# Patient Record
Sex: MALE | Race: BLACK OR AFRICAN AMERICAN | NOT HISPANIC OR LATINO
[De-identification: names, ages, dates, MRNs, and addresses within clinical notes are randomized per-mention and may not be internally consistent; named-entity substitution may affect disease eponyms.]

---
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
## 2018-03-16 ENCOUNTER — APPOINTMENT (OUTPATIENT)
Dept: ORTHOPEDIC SURGERY | Facility: CLINIC | Age: 18
End: 2018-03-16
Payer: MEDICAID

## 2018-03-16 PROCEDURE — 99213 OFFICE O/P EST LOW 20 MIN: CPT

## 2018-05-02 ENCOUNTER — OUTPATIENT (OUTPATIENT)
Dept: OUTPATIENT SERVICES | Facility: HOSPITAL | Age: 18
LOS: 1 days | End: 2018-05-02

## 2018-05-02 VITALS
HEART RATE: 56 BPM | HEIGHT: 75 IN | SYSTOLIC BLOOD PRESSURE: 110 MMHG | RESPIRATION RATE: 16 BRPM | DIASTOLIC BLOOD PRESSURE: 70 MMHG | TEMPERATURE: 97 F | WEIGHT: 175.05 LBS

## 2018-05-02 DIAGNOSIS — S43.439A SUPERIOR GLENOID LABRUM LESION OF UNSPECIFIED SHOULDER, INITIAL ENCOUNTER: ICD-10-CM

## 2018-05-02 DIAGNOSIS — S43.431D SUPERIOR GLENOID LABRUM LESION OF RIGHT SHOULDER, SUBSEQUENT ENCOUNTER: ICD-10-CM

## 2018-05-02 LAB
HCT VFR BLD CALC: 45.8 % — SIGNIFICANT CHANGE UP (ref 39–50)
HGB BLD-MCNC: 15.7 G/DL — SIGNIFICANT CHANGE UP (ref 13–17)
MCHC RBC-ENTMCNC: 31.8 PG — SIGNIFICANT CHANGE UP (ref 27–34)
MCHC RBC-ENTMCNC: 34.3 % — SIGNIFICANT CHANGE UP (ref 32–36)
MCV RBC AUTO: 92.7 FL — SIGNIFICANT CHANGE UP (ref 80–100)
NRBC # FLD: 0 — SIGNIFICANT CHANGE UP
PLATELET # BLD AUTO: 275 K/UL — SIGNIFICANT CHANGE UP (ref 150–400)
PMV BLD: 9.4 FL — SIGNIFICANT CHANGE UP (ref 7–13)
RBC # BLD: 4.94 M/UL — SIGNIFICANT CHANGE UP (ref 4.2–5.8)
RBC # FLD: 12.2 % — SIGNIFICANT CHANGE UP (ref 10.3–14.5)
WBC # BLD: 9.72 K/UL — SIGNIFICANT CHANGE UP (ref 3.8–10.5)
WBC # FLD AUTO: 9.72 K/UL — SIGNIFICANT CHANGE UP (ref 3.8–10.5)

## 2018-05-02 NOTE — H&P PST ADULT - PROBLEM SELECTOR PLAN 1
Pt. is scheduled for right shoulder arthroscopic posterior labral repair on 5/10/18.  Preoperative instructions reviewed, pt verbalized understanding.  Preop Famotidine provided.  Lab results pending

## 2018-05-02 NOTE — H&P PST ADULT - HISTORY OF PRESENT ILLNESS
16 y/o male with history of right shoulder pain presents to PAST for presurgical evaluation.  He injured his shoulder approximately 2 years ago playing basketball.  Initially after rest the pain improved, but he reinjured it approximately a year ago working out.  He complains of pain with certain movements or when he uses the right arm too much.  The pain is worse after trying to play basketball. His ROM is limited due to the pain.  He is scheduled for right shoulder arthroscopic posterior labral repair on 5/10/18.

## 2018-05-09 ENCOUNTER — TRANSCRIPTION ENCOUNTER (OUTPATIENT)
Age: 18
End: 2018-05-09

## 2018-05-10 ENCOUNTER — OUTPATIENT (OUTPATIENT)
Dept: OUTPATIENT SERVICES | Facility: HOSPITAL | Age: 18
LOS: 1 days | Discharge: HOME | End: 2018-05-10
Payer: MEDICAID

## 2018-05-10 ENCOUNTER — APPOINTMENT (OUTPATIENT)
Dept: ORTHOPEDIC SURGERY | Facility: AMBULATORY SURGERY CENTER | Age: 18
End: 2018-05-10

## 2018-05-10 VITALS — HEART RATE: 64 BPM | OXYGEN SATURATION: 100 % | DIASTOLIC BLOOD PRESSURE: 56 MMHG | SYSTOLIC BLOOD PRESSURE: 104 MMHG

## 2018-05-10 VITALS
TEMPERATURE: 98 F | HEART RATE: 54 BPM | WEIGHT: 175.05 LBS | OXYGEN SATURATION: 99 % | SYSTOLIC BLOOD PRESSURE: 118 MMHG | RESPIRATION RATE: 16 BRPM | DIASTOLIC BLOOD PRESSURE: 68 MMHG | HEIGHT: 75 IN

## 2018-05-10 DIAGNOSIS — S43.431D SUPERIOR GLENOID LABRUM LESION OF RIGHT SHOULDER, SUBSEQUENT ENCOUNTER: ICD-10-CM

## 2018-05-10 PROCEDURE — 29806 SHO ARTHRS SRG CAPSULORRAPHY: CPT | Mod: RT

## 2018-05-10 NOTE — ASU DISCHARGE PLAN (ADULT/PEDIATRIC). - SPECIAL INSTRUCTIONS
see instruction sheet see instruction sheet  POST OPERATIVE INSTRUCTIONS ARTHROSCOPIC SHOULDER STABILIZATION from DR. HOPKINS

## 2018-05-10 NOTE — ASU DISCHARGE PLAN (ADULT/PEDIATRIC). - MEDICATION SUMMARY - MEDICATIONS TO TAKE
I will START or STAY ON the medications listed below when I get home from the hospital:    Percocet 5/325 oral tablet  -- 1-2 tab(s) by mouth every 4 hours, As Needed MDD:10 tabs    -- Caution federal law prohibits the transfer of this drug to any person other  than the person for whom it was prescribed.  May cause drowsiness.  Alcohol may intensify this effect.  Use care when operating dangerous machinery.  This prescription cannot be refilled.  This product contains acetaminophen.  Do not use  with any other product containing acetaminophen to prevent possible liver damage.  Using more of this medication than prescribed may cause serious breathing problems.    -- Indication: For pain

## 2018-05-10 NOTE — ASU DISCHARGE PLAN (ADULT/PEDIATRIC). - NURSING INSTRUCTIONS
No fried, spicy or greasy foods. Increase fluids as tolerated  If your doctor prescribed narcotic pain medications after your procedure to help prevent and reduce constipation, increase fluid intake and fiber intake in your diet. You may take OTC Stool Softeners such as Colace to help reduce constipation.  Apply ice for 15 to 20 minutes every hour for the first day. Use an ice pack or put crushed ice in a plastic bag. Cover it with a cloth. Ice helps prevent tissue damage and decreases swelling and pain.

## 2018-05-10 NOTE — BRIEF OPERATIVE NOTE - PROCEDURE
<<-----Click on this checkbox to enter Procedure Labral repair of right shoulder  05/10/2018    Active  PABLOL

## 2018-05-18 ENCOUNTER — APPOINTMENT (OUTPATIENT)
Dept: ORTHOPEDIC SURGERY | Facility: CLINIC | Age: 18
End: 2018-05-18
Payer: MEDICAID

## 2018-05-18 PROCEDURE — 99024 POSTOP FOLLOW-UP VISIT: CPT

## 2018-07-26 ENCOUNTER — APPOINTMENT (OUTPATIENT)
Dept: ORTHOPEDIC SURGERY | Facility: CLINIC | Age: 18
End: 2018-07-26
Payer: MEDICAID

## 2018-07-26 DIAGNOSIS — S43.431D SUPERIOR GLENOID LABRUM LESION OF RIGHT SHOULDER, SUBSEQUENT ENCOUNTER: ICD-10-CM

## 2018-07-26 PROCEDURE — 99024 POSTOP FOLLOW-UP VISIT: CPT

## 2018-10-16 ENCOUNTER — OTHER (OUTPATIENT)
Age: 18
End: 2018-10-16

## 2019-01-08 NOTE — PDOC
History of Present Illness





- General


Chief Complaint: Sore Throat


Stated Complaint: SWOLLEN THROAT


Time Seen by Provider: 01/08/19 13:44


History Source: Patient


Exam Limitations: No Limitations





- History of Present Illness


Initial Comments: 





19 yo M w no sig pmh presents with one day of "throat pain and uvula swelling." 

He woke up this morning and said the back of his throat felt uncomfortable. He 

rates the pain as 4/10. He does not admit to any dysphagia. He denies recent 

fevers, chills, infections, cough, or neck pain. 





Denies chest pain, SOB, difficulty breathing, headache, neck pain, blurry vision

, back pain, odynophagia. 





PCP: Dr. Preciado


PSH: Right shoulder rotater cuff surgery


Allergies: NKA, NKDA


Social Hx: Denies smoking, drinking, or other substance usage. 














Past History





- Past Medical History


Allergies/Adverse Reactions: 


 Allergies











Allergy/AdvReac Type Severity Reaction Status Date / Time


 


No Known Allergies Allergy   Verified 01/08/19 13:41











Home Medications: 


Ambulatory Orders





NK [No Known Home Medication]  01/08/19 








COPD: No


Other medical history: DENIES





- Suicide/Smoking/Psychosocial Hx


Smoking History: Never smoked


Hx Alcohol Use: No


Drug/Substance Use Hx: No





**Review of Systems





- Review of Systems


Able to Perform ROS?: Yes


Comments:: 





CONSTITUTIONAL:


Absent: fever, no chills, no fatigue


EYES:


Absent: visual changes


ENT:


Absent: ear pain, no sore throat


CARDIOVASCULAR:


Absent: chest pain, no palpitations


RESPIRATORY:


Absent: cough, no SOB


GI:


Absent: abdominal pain, no nausea, no vomiting, no constipation, no diarrhea


GENITOURINARY:


Absent: dysuria, no frequency, no hematuria


MUSKULOSKELETAL:


Absent: back pain, no arthralgia, no myalgia


SKIN:


Absent: rash


NEURO:


Absent: headache








*Physical Exam





- Vital Signs


 Last Vital Signs











Temp Pulse Resp BP Pulse Ox


 


 98.6 F   59 L  18   124/81   100 


 


 01/08/19 13:41  01/08/19 13:41  01/08/19 13:41  01/08/19 13:41  01/08/19 13:41




















- Physical Exam


Comments: 





GENERAL: 


Well-appearing, well-nourished. No apparent distress.


HEEN: 


Normocephalic, atraumatic. PERRL, EOM intact.


THROAT: 


There is no abdnomarl peritonsillar discharge. The R tonsil is mildly 

erythematous and swollen. No adenopathy. 


CARDIOVASCULAR: 


Normal S1, S2. Regular rate and rhythm.


PULMONARY: 


Clear to auscultation bilaterally.


ABDOMEN: 


Soft, non-distended, non-tender. 


EXTREMITIES: 


Normal ROM in all four extremities. No gross deformities.


SKIN: 


Warm, dry.  No rash


NEUROLOGICAL: 


No focal neurological deficits.











Moderate Sedation





- Procedure Monitoring


Vital Signs: 


Procedure Monitoring Vital Signs











Temperature  98.6 F   01/08/19 13:41


 


Pulse Rate  59 L  01/08/19 13:41


 


Respiratory Rate  18   01/08/19 13:41


 


Blood Pressure  124/81   01/08/19 13:41


 


O2 Sat by Pulse Oximetry (%)  100   01/08/19 13:41











Medical Decision Making





- Medical Decision Making





19 yo M w no sig pmh presents with one day of "throat pain and uvula swelling." 

He woke up this morning and said the back of his throat felt uncomfortable.





DDx IBNLT: Strep throat, sophia-tonsilar abscess, URI. 





Plan: Rapid strep test, toradol, re-assess. 





Strep Negative. 


This is likely a URI. 





DC with supportive care and PCP fu. 








*DC/Admit/Observation/Transfer


Diagnosis at time of Disposition: 


 Tonsil pain, URI (upper respiratory infection)








- Discharge Dispostion


Disposition: HOME


Condition at time of disposition: Stable


Decision to Admit order: No





- Referrals


Referrals: 


[facility] [place] [facility] [place] [Provider Group]





- Patient Instructions


Printed Discharge Instructions:  Sore Throat, DI for Pharyngitis/

Tonsillopharyngitis -- Adult


Additional Instructions: 


You came into the ER with throat pain. We did a test which showed you do not 

have strep throat. We believe you have an upper respiratory infection. Take 

motrin, advil, ibuprofen or tylenol as needed for pain control. Make sure to 

call your primary care doctor in the next 24 hours to schedule a follow up 

appointment. 





Come back to the ER if your pain worsens, you get a fever, start vomiting, or 

have any other new or worsening concerns. 





Thank you for coming to the [facility] ED. We hope you feel better soon! 


Print Language: ENGLISH





- Post Discharge Activity

## 2019-01-08 NOTE — PDOC
Attending Attestation





- Resident


Resident Name: Eleno Bustamante





- ED Attending Attestation


I have performed the following: I have examined & evaluated the patient, The 

case was reviewed & discussed with the resident, I agree w/resident's findings 

& plan, Exceptions are as noted





- HPI


HPI: 





Reviewed residents HPI





- Physicial Exam


PE: 





01/08/19 14:55








Vitals: Triage Vital signs reviewed  


General Appearance:  no acute distress, well nourished well developed, 


Head: Atraumatic, 


Ears:  TM's normal bilaterally;


Nose: Nares patent bilaterally;no nasal congestion


Throat: Posterior oropharynx with mild erythema, and mild swelling to the right 

tonsil, mucous membranes moist,


Neck:  Supple;No Nucal rigidity


Chest Wall: Nontender


Cardiac: Regular rate and rhythym, no murmurs, no rubs, no gallops, 


Lungs: Clear to auscultation bilateral, good air movement bilaterally,


Skin:  Warm and dry, no rashes or lesions, no rash, no petechiae


Psych:  normal mood, normal affect








- Medical Decision Making





01/08/19 14:56





Mild right tonsillar enlargement no evidence of peritonsillar abscess rapid flu 

test negative no airway issues no odynophagia no severe pain





We'll treat with conservative measures likely viral pharyngitis patient 

instructed to return to ED for any severe worsening symptoms burning concerns.

## 2019-09-11 ENCOUNTER — HOSPITAL ENCOUNTER (EMERGENCY)
Dept: HOSPITAL 74 - FER | Age: 19
Discharge: HOME | End: 2019-09-11
Payer: COMMERCIAL

## 2019-09-11 VITALS — BODY MASS INDEX: 23.7 KG/M2

## 2019-09-11 VITALS — HEART RATE: 69 BPM | DIASTOLIC BLOOD PRESSURE: 79 MMHG | SYSTOLIC BLOOD PRESSURE: 125 MMHG | TEMPERATURE: 98.6 F
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
## 2019-09-11 DIAGNOSIS — S83.8X1A: Primary | ICD-10-CM

## 2019-09-11 PROCEDURE — 2W3LX1Z IMMOBILIZATION OF RIGHT LOWER EXTREMITY USING SPLINT: ICD-10-PCS

## 2019-09-11 NOTE — PDOC
History of Present Illness





- General


Chief Complaint: Injury


Stated Complaint: RIGHT KNEE INJURY


Time Seen by Provider: 09/11/19 12:50





- History of Present Illness


Initial Comments: 





09/11/19 13:57


19-year-old male with no significant past medical history presents with knee 

pain for 1 hour. Patient reports he was running while playing basketball when a 

player suddenly stopped him while his R leg was planted but the rest of his 

body twisted. Pt reports hearing a "pop" which prompted him to sit down on the 

ground. Denies any head strike or LOC. Denies any other injuries. Reports 5 out 

of 10 pain at this time and his knee while resting but reports pain improved 

significantly when he tries to stand. Has not taken any medications. EMS was 

called and patient was transported to the emergency department. He has been in 

his usual state of health, denies recent fevers, chills, chest pain, shortness 

of breath, dizziness, weakness or numbness. Denies any urinary symptoms.





Past History





- Past Medical History


Allergies/Adverse Reactions: 


 Allergies











Allergy/AdvReac Type Severity Reaction Status Date / Time


 


No Known Allergies Allergy   Verified 01/08/19 13:41











Home Medications: 


Ambulatory Orders





NK [No Known Home Medication]  09/11/19 








COPD: No





- Suicide/Smoking/Psychosocial Hx


Smoking History: Never smoked


Information on smoking cessation initiated: No


Hx Alcohol Use: Yes (OCCASSIONAL)


Drug/Substance Use Hx: Yes (WEED)





**Review of Systems





- Review of Systems


Comments:: 





09/11/19 14:03


GENERAL/CONSTITUTIONAL: No fever or chills. No weakness.


HEAD, EYES, EARS, NOSE AND THROAT: No change in vision. No ear pain or 

discharge. No sore throat.


GASTROINTESTINAL: No nausea, vomiting, diarrhea or constipation.


GENITOURINARY: No dysuria, frequency, or change in urination.


CARDIOVASCULAR: No chest pain or shortness of breath.


RESPIRATORY: No cough, wheezing, or hemoptysis.


MUSCULOSKELETAL: +Knee pain. No muscle swelling or pain. No neck or back pain.


SKIN: No rash


NEUROLOGIC: No headache, vertigo, loss of consciousness, or change in strength/

sensation.


ENDOCRINE: No increased thirst. No abnormal weight change.


HEMATOLOGIC/LYMPHATIC: No anemia, easy bleeding, or history of blood clots.


ALLERGIC/IMMUNOLOGIC: No hives or skin allergy.





*Physical Exam





- Vital Signs


 Last Vital Signs











Temp Pulse Resp BP Pulse Ox


 


 98.6 F   69   16   125/79   100 


 


 09/11/19 12:48  09/11/19 12:48  09/11/19 12:48  09/11/19 12:48  09/11/19 12:48














- Physical Exam


Comments: 





09/11/19 14:04


GENERAL: Awake, alert, and fully oriented, in no acute distress


HEAD: No signs of trauma


EYES: PERRLA, EOMI, sclera anicteric, conjunctiva clear


ENT: ANares patent, oropharynx clear without exudates. Moist mucosa


NECK: Normal ROM, supple, no lymphadenopathy, JVD, or masses


LUNGS: Breath sounds equal, clear to auscultation bilaterally.  No wheezes, and 

no crackles


HEART: Regular rate and rhythm, normal S1 and S2, no murmurs, rubs or gallops


ABDOMEN: Soft, nontender, normoactive bowel sounds.  No guarding, no rebound.  

No masses


EXTREMITIES: R knee with FROM, no edema, no focal bony ttp. +mild laxity and 

pain with valgus stress to the knee. Negative lachman, anterior drawer and 

posterior drawer test, negative mcmurrays test. 2+ DP and TP pulses. WWP 

distally. Compartments of RLE are soft. 


NEUROLOGICAL:  Normal speech, cranial nerves intact, equal strength and 

sensation b/l


SKIN: Warm, Dry, normal turgor, no rashes or lesions noted.





ED Treatment Course





- RADIOLOGY


Radiology Studies Ordered: 














 Category Date Time Status


 


 KNEE 4 POS-RIGHT [RAD] Stat Radiology  09/11/19 12:49 Taken














Medical Decision Making





- Medical Decision Making





09/11/19 14:21


20yo M presents to the ED with R knee pain after playing basketball


RLE neurovasc intact


XR negative for bony injury 


Exam most consistent with MCL sprain/tear


Plan for knee immobilizer, crutches, naproxen for pain and f/u with orthopedics


Pain well controlled


Pt clincially stable for Dc home





I discussed the physical exam findings, ancillary test results and final 

diagnoses with the patient. I answered all of the patient's questions. The 

patient was satisfied with the care received and felt comfortable with the 

discharge plan and treatment plan. The patient will call their primary care 

physician within 24 hours to arrange follow-up and will return to the Emergency 

Department with any new, persistent or worsening symptoms. 





*DC/Admit/Observation/Transfer


Diagnosis at time of Disposition: 


 Knee pain, acute, MCL sprain of right knee, Injury while playing basketball








- Discharge Dispostion


Disposition: HOME


Condition at time of disposition: Stable


Decision to Admit order: No





- Referrals


Referrals: 


Abhi Calvillo MD [Staff Physician] - 





- Patient Instructions


Printed Discharge Instructions:  DI for Knee Pain


Additional Instructions: 


You likely have a ligamentous injury to your knee. You must follow up with the 

orthopedic doctor within 1 week for further evaluation. 





You may take naproxen for pain as needed twice a day. Do not take motrin, aleve

, ibuprofen, advil or any other NSAID medication if you are taking naproxen as 

it can damage your kidneys or cause gastrointestinal distress. 





You may also take tylenol (per label instructions) if naproxen is not 

controlling your pain.





Apply ice to your right knee 3-4 times daily for 20 minutes at a time





Return to the emergency department if you have any new, worsening, or 

concerning symptoms.





- Post Discharge Activity





- Attestations


Physician Attestion: 





09/11/19 14:28








I, Dr. Yessy Farley MD, attest that this document has been prepared under my 

direction and personally reviewed by me in its entirety.   I further attest, 

that it accurately reflects all work, treatment, procedures and medical decision

-making performed by me.

## 2019-10-31 ENCOUNTER — HOSPITAL ENCOUNTER (EMERGENCY)
Dept: HOSPITAL 74 - FER | Age: 19
Discharge: HOME | End: 2019-10-31
Payer: COMMERCIAL

## 2019-10-31 VITALS — TEMPERATURE: 99 F | SYSTOLIC BLOOD PRESSURE: 125 MMHG | DIASTOLIC BLOOD PRESSURE: 79 MMHG | HEART RATE: 81 BPM

## 2019-10-31 VITALS — BODY MASS INDEX: 24.3 KG/M2

## 2019-10-31 DIAGNOSIS — F17.210: ICD-10-CM

## 2019-10-31 DIAGNOSIS — R51: Primary | ICD-10-CM

## 2019-10-31 DIAGNOSIS — R09.81: ICD-10-CM

## 2019-10-31 PROCEDURE — 3E0337Z INTRODUCTION OF ELECTROLYTIC AND WATER BALANCE SUBSTANCE INTO PERIPHERAL VEIN, PERCUTANEOUS APPROACH: ICD-10-PCS

## 2019-10-31 NOTE — PDOC
Attending Attestation





- Resident


Resident Name: Sa Aminataira





- ED Attending Attestation


I have performed the following: I have examined & evaluated the patient, The 

case was reviewed & discussed with the resident, I agree w/resident's findings 

& plan





- HPI


HPI: 





10/31/19 17:22





19 YOM with no sig med history presenting with sinus congestion, throat pain, 

headache x 4 days. initially with sore throat, which has improved. no 

odynophagia, no fever or chills, no cough, no cp or sob. 


no ear pain


has not take much for his pain or symptoms.








- Physicial Exam


PE: 





10/31/19 17:23











alert, no acute distress.NCAT, PERRL, EOMI, clear conjunctiva, anicteric, moist 

mucus membranes, oropharynx clear. Airway patent, normal phonation. Uvula 

midline. no tonsillar hypertrophy. No sinus tenderness, TM clear, no pinna 

tenderness to manipulation. 


No rash, breathing comfortably on room air, speaking full sentences.  Abdomen 

is soft and nontender, no neck tenderness or rigidity, ESTRADA x4.no focal neuro 

deficits











- Medical Decision Making





10/31/19 17:25


Vital Signs











Temp Pulse Resp BP Pulse Ox


 


 99 F   81   19   125/79   97 


 


 10/31/19 17:03  10/31/19 17:03  10/31/19 17:03  10/31/19 17:03  10/31/19 17:03








Vital signs reviewed within normal limits.  Nontoxic-appearing, tolerating oral 

intake.  Neuro intact.  No systemic findings.  HEENT is unremarkable, no 

tonsillar findings or oropharyngeal abnormalities to suggest strep infection.  

no airway obstruction. Syndrome the patient is exhibiting is most likely due to 

a viral pharyngitis versus upper respiratory infection.  Will trial saline nebs 

and Afrin sprays in the affected naris for appropriate decongestion.  Not to be 

used more than 3 days.  Continue with supportive care, adequate hydration, OTC 

analgesia and follow-up with primary care doctor, return precautions advised.





10/31/19 17:50

## 2019-10-31 NOTE — PDOC
History of Present Illness





- General


Chief Complaint: Headache


Stated Complaint: nasal congestion and headache


Time Seen by Provider: 10/31/19 17:04


History Source: Patient


Exam Limitations: No Limitations





- History of Present Illness


Initial Comments: 





10/31/19 17:36


19y M with no significant PMH presenting to ED with complaints of congestion 

and headache. Symptoms began 3d ago with sore throat. The sore throat is 

resolved but the headache and congestion persist. He says the headache is 

frontal along the sinuses. Has not had headaches before. Denies changes in 

vision, fevers, chills, earache, neck pain, abdominal pain, n/v/d, chest pain, 

sob. Has tried nasal sprays which help but it has not reduced the congestion. 

Has not taken other medications. 











Past History





- Past Medical History


Allergies/Adverse Reactions: 


 Allergies











Allergy/AdvReac Type Severity Reaction Status Date / Time


 


No Known Allergies Allergy   Verified 10/31/19 17:04











Home Medications: 


Ambulatory Orders





NK [No Known Home Medication]  10/31/19 








COPD: No





- Psycho Social/Smoking Cessation Hx


Smoking History: Current some day smoker


Number of Cigarettes Smoked Daily: 1


Information on smoking cessation initiated: Yes


Hx Alcohol Use: Yes (occasional)


Drug/Substance Use Hx: No





**Review of Systems





- Review of Systems


Constitutional: No: Symptoms Reported


HEENTM: Yes: See HPI


Respiratory: No: Symptoms reported


Cardiac (ROS): No: Symptoms Reported


ABD/GI: No: Symptoms Reported


: No: Symptoms Reported


Musculoskeletal: No: Symptoms Reported


Integumentary: No: Symptoms Reported


Neurological: No: Symptoms reported





*Physical Exam





- Vital Signs


 Last Vital Signs











Temp Pulse Resp BP Pulse Ox


 


 99 F   81   19   125/79   97 


 


 10/31/19 17:03  10/31/19 17:03  10/31/19 17:03  10/31/19 17:03  10/31/19 17:03














- Physical Exam


General Appearance: Yes: Nourished, Appropriately Dressed.  No: Apparent 

Distress


HEENT: positive: EOMI, ABRAHAM, Pharynx Normal.  negative: Rhinorrhea, Sinus 

Tenderness


Neck: positive: Trachea midline, Supple.  negative: Lymphadenopathy (R), 

Lymphadenopathy (L)


Respiratory/Chest: positive: Lungs Clear, Normal Breath Sounds.  negative: 

Crackles, Rales, Rhonchi, Stridor, Wheezing


Cardiovascular: positive: Regular Rhythm, Regular Rate, S1, S2.  negative: Edema

, JVD, Murmur


Gastrointestinal/Abdominal: positive: Normal Bowel Sounds, Soft.  negative: 

Tender


Musculoskeletal: negative: CVA Tenderness


Integumentary: positive: Normal Color, Dry, Warm


Neurologic: positive: CNs II-XII NML intact, Fully Oriented, Alert, Normal Mood/

Affect, Normal Response, Motor Strength 5/5





Medical Decision Making





- Medical Decision Making





10/31/19 17:38


19y M presenting with congestion and headache. 


vitals wnl


pe benign.





ddx includes but not limited to sinusitis, tension ha, migraine ha, cluster ha, 

cvt.





pt appears well, likely sinusitis/congestion. 


will give Afrin, saline nebulizer and ibuprofen. reassess. 


does not need labs or imaging at this time. 





feeling better, safe for dc home. 


given Afrin and instructions for Sudafed. 


pt agrees to plan


dispo: home








Discharge





- Discharge Information


Problems reviewed: Yes


Clinical Impression/Diagnosis: 


 Congestion of nasal sinus





Headache


Qualifiers:


 Headache type: unspecified Headache chronicity pattern: acute headache 

Intractability: not intractable Qualified Code(s): R51 - Headache





Condition: Improved


Disposition: HOME





- Admission


No





- Follow up/Referral





- Patient Discharge Instructions


Patient Printed Discharge Instructions:  DI for Headache, DI for Nasal 

Congestion


Additional Instructions: 


You were seen in the emergency room today for congestion and headache. I 

recommend using the Afrin and taking Sudafed which is over the counter but 

found in the pharmacy. 


You can take ibuprofen or Tylenol for the headaches as needed. 





Come back to the emergency room for worsening headaches, changes in vision, 

fevers, or if any new or concerning symptom develops. 





Thank you





- Post Discharge Activity

## 2019-11-03 ENCOUNTER — HOSPITAL ENCOUNTER (EMERGENCY)
Dept: HOSPITAL 74 - JERFT | Age: 19
Discharge: HOME | End: 2019-11-03
Payer: COMMERCIAL

## 2019-11-03 VITALS — DIASTOLIC BLOOD PRESSURE: 79 MMHG | HEART RATE: 68 BPM | TEMPERATURE: 98 F | SYSTOLIC BLOOD PRESSURE: 118 MMHG

## 2019-11-03 VITALS — BODY MASS INDEX: 24.3 KG/M2

## 2019-11-03 DIAGNOSIS — J02.9: Primary | ICD-10-CM

## 2019-11-03 DIAGNOSIS — F17.210: ICD-10-CM

## 2019-11-03 PROCEDURE — 3E0233Z INTRODUCTION OF ANTI-INFLAMMATORY INTO MUSCLE, PERCUTANEOUS APPROACH: ICD-10-PCS

## 2019-11-03 NOTE — PDOC
History of Present Illness





- General


Chief Complaint: Sore Throat


Stated Complaint: SORE THROAT


Time Seen by Provider: 11/03/19 10:03





- History of Present Illness


Initial Comments: 





11/03/19 10:21


CHIEF COMPLAINT: sore throat





HISTORY OF PRESENT ILLNESS: 18 yo M with no PMH presents to [facility] with 

sore throat "for some time."  Patient reports today the pain became worse and 

he feels like he has difficulty swallowing.  Denies any fever, chills, runny 

nose, nausea, vomiting, diarrhea. 





No recent travel or sick contacts. 





PAST MEDICAL HISTORY: Denies past medical history





FAMILY HISTORY: Denies





SOCIAL HISTORY: Denies tobacco, alcohol, illicit drug use. 





SURGICAL HISTORY: Denies





ALLERGIES: No known drug allergies





REVIEW OF SYSTEMS


General/Constitutional: Denies fever or chills. Denies weakness, weight change.





HEENT: Sore throat. Denies change in vision. Denies ear pain or discharge.


Cardiovascular: Denies chest pain or shortness of breath.





Respiratory: Denies cough, wheezing, or hemoptysis.





Gastrointestinal: Denies nausea, vomiting, diarrhea or constipation. Denies 

rectal bleeding.





Genitourinary: Denies dysuria, frequency, or change in urination.





Musculoskeletal: Denies joint or muscle swelling or pain. Denies neck or back 

pain.





Skin and breasts: Denies rash or easy bruising.





Neurologic: Denies headache, vertigo, loss of consciousness, or loss of 

sensation.





Psychiatric: Denies depression or anxiety.











PHYSICAL EXAM


General Appearance: Well-appearing, appropriately dressed.  No apparent distress

, no intoxication.





HEENT: Tonsils erythematous, 3+ bilaterally. EOMI, PERRLA, normal ENT inspection

, normal voice, TMs normal, pharynx normal.  No conjunctival pallor.  No 

photophobia, scleral icterus.





Neck: Supple.  Trachea midline. No tenderness, rigidity, carotid bruit, stridor

, lymphadenopathy, or thyromegaly. 





Respiratory/Chest: Lungs CTAB.  No shortness of breath, chest tenderness, 

respiratory distress, accessory muscle use. No crackles, rales, rhonchi, stridor

, wheezing, dullness





Cardiovascular: RRR. S1, S2.  No JVD, murmur, bradycardia, tachycardia.





Vascular Pulses: Dorsalis-Pedis (R): 2+, Dorsalis-Pedis (L): 2+





Gastrointestinal/Abdominal: Normal bowel sounds.  Abdomen soft, non-distended.  

No tenderness or rebound tenderness. No  organomegaly, pulsatile mass, guarding

, hernia, hepatomegaly, splenomegaly.





Lymphatic: No adenopathy, tenderness.





Musculoskeletal/Extremities:  Normal inspection. FROM of all extremities, 

normal capillary refill.  Pelvis Stable.  No CVA tenderness. No tenderness to 

extremities, pedal edema, swelling, erythema or deformity.





Integumentary: Appropriate color, dry, warm.  No cyanosis, erythema, jaundice 

or rash





Neurologic: CNs II-XII intact. Fully oriented, alert.  Appropriate mood/affect. 

Motor strength 5/5.  No appreciable EOM palsy, facial droop or sensory deficit.


11/03/19 10:57








Past History





- Past Medical History


Allergies/Adverse Reactions: 


 Allergies











Allergy/AdvReac Type Severity Reaction Status Date / Time


 


No Known Allergies Allergy   Verified 11/03/19 09:54











Home Medications: 


Ambulatory Orders





Ibuprofen 600 mg PO TID #20 tablet 11/03/19 


Ibuprofen 600 mg PO TID #30 tablet 11/03/19 








COPD: No





- Psycho Social/Smoking Cessation Hx


Smoking History: Current some day smoker


Number of Cigarettes Smoked Daily: 1


Information on smoking cessation initiated: No


Hx Alcohol Use: Yes (occasional)


Drug/Substance Use Hx: No





*Physical Exam





- Vital Signs


 Last Vital Signs











Temp Pulse Resp BP Pulse Ox


 


 98 F   68   18   118/79   99 


 


 11/03/19 09:52  11/03/19 09:52  11/03/19 09:52  11/03/19 09:52  11/03/19 09:52














Medical Decision Making





- Medical Decision Making





11/03/19 11:06


18 yo M with no PMH presents to [facility] with sore throat "for some time."





-strep swab


-decadron





strep negative. 








Discharge





- Discharge Information


Problems reviewed: Yes


Clinical Impression/Diagnosis: 


Pharyngitis


Qualifiers:


 Pharyngitis/tonsillitis etiology: unspecified etiology Qualified Code(s): 

J02.9 - Acute pharyngitis, unspecified





Condition: Stable


Disposition: HOME





- Admission


No





- Additional Discharge Information


Prescriptions: 


Ibuprofen 600 mg PO TID #20 tablet


Ibuprofen 600 mg PO TID #30 tablet





- Follow up/Referral





- Patient Discharge Instructions


Patient Printed Discharge Instructions:  DI for Viral Pharyngitis





- Post Discharge Activity


Work/Back to School Note:  Back to Work

## 2019-11-07 ENCOUNTER — TRANSCRIPTION ENCOUNTER (OUTPATIENT)
Age: 19
End: 2019-11-07

## 2019-11-13 PROBLEM — S43.439A SUPERIOR GLENOID LABRUM LESION OF UNSPECIFIED SHOULDER, INITIAL ENCOUNTER: Chronic | Status: ACTIVE | Noted: 2018-05-02

## 2019-11-18 ENCOUNTER — APPOINTMENT (OUTPATIENT)
Dept: ORTHOPEDIC SURGERY | Facility: CLINIC | Age: 19
End: 2019-11-18
Payer: MEDICAID

## 2019-11-18 DIAGNOSIS — S83.511A SPRAIN OF ANTERIOR CRUCIATE LIGAMENT OF RIGHT KNEE, INITIAL ENCOUNTER: ICD-10-CM

## 2019-11-18 DIAGNOSIS — S83.241A OTHER TEAR OF MEDIAL MENISCUS, CURRENT INJURY, RIGHT KNEE, INITIAL ENCOUNTER: ICD-10-CM

## 2019-11-18 PROCEDURE — 73562 X-RAY EXAM OF KNEE 3: CPT | Mod: RT

## 2019-11-18 PROCEDURE — 99214 OFFICE O/P EST MOD 30 MIN: CPT

## 2019-11-22 ENCOUNTER — OUTPATIENT (OUTPATIENT)
Dept: OUTPATIENT SERVICES | Facility: HOSPITAL | Age: 19
LOS: 1 days | End: 2019-11-22

## 2019-11-22 VITALS
OXYGEN SATURATION: 98 % | HEIGHT: 74 IN | DIASTOLIC BLOOD PRESSURE: 70 MMHG | WEIGHT: 192.02 LBS | HEART RATE: 98 BPM | TEMPERATURE: 98 F | SYSTOLIC BLOOD PRESSURE: 114 MMHG | RESPIRATION RATE: 16 BRPM

## 2019-11-22 DIAGNOSIS — S83.241A OTHER TEAR OF MEDIAL MENISCUS, CURRENT INJURY, RIGHT KNEE, INITIAL ENCOUNTER: ICD-10-CM

## 2019-11-22 DIAGNOSIS — Z98.890 OTHER SPECIFIED POSTPROCEDURAL STATES: Chronic | ICD-10-CM

## 2019-11-22 DIAGNOSIS — S83.519A SPRAIN OF ANTERIOR CRUCIATE LIGAMENT OF UNSPECIFIED KNEE, INITIAL ENCOUNTER: ICD-10-CM

## 2019-11-22 LAB
HCT VFR BLD CALC: 44.5 % — SIGNIFICANT CHANGE UP (ref 39–50)
HGB BLD-MCNC: 15.3 G/DL — SIGNIFICANT CHANGE UP (ref 13–17)
MCHC RBC-ENTMCNC: 31.7 PG — SIGNIFICANT CHANGE UP (ref 27–34)
MCHC RBC-ENTMCNC: 34.4 % — SIGNIFICANT CHANGE UP (ref 32–36)
MCV RBC AUTO: 92.1 FL — SIGNIFICANT CHANGE UP (ref 80–100)
NRBC # FLD: 0 K/UL — SIGNIFICANT CHANGE UP (ref 0–0)
PLATELET # BLD AUTO: 377 K/UL — SIGNIFICANT CHANGE UP (ref 150–400)
PMV BLD: 8.9 FL — SIGNIFICANT CHANGE UP (ref 7–13)
RBC # BLD: 4.83 M/UL — SIGNIFICANT CHANGE UP (ref 4.2–5.8)
RBC # FLD: 12.3 % — SIGNIFICANT CHANGE UP (ref 10.3–14.5)
WBC # BLD: 13.21 K/UL — HIGH (ref 3.8–10.5)
WBC # FLD AUTO: 13.21 K/UL — HIGH (ref 3.8–10.5)

## 2019-11-22 NOTE — H&P PST ADULT - HISTORY OF PRESENT ILLNESS
16 y/o male with history of right knee pain from sep 2019 after his fall playing basketball, further testing with X ray and MRI showed meniscus and ACL tear. Pt presents to [facility] for presurgical evaluation to have right knee arthroscopy ACL reconstruction with bone tendon autograft, medial meniscus repair on 11/26/19

## 2019-11-22 NOTE — H&P PST ADULT - ASSESSMENT
16 y/o male with history of right knee pain from sep 2019 after his fall playing basketball, presents to [facility] for presurgical evaluation to have right knee arthroscopy ACL reconstruction with bone tendon autograft, medial meniscus repair on 11/26/19

## 2019-11-22 NOTE — H&P PST ADULT - NSICDXPROBLEM_GEN_ALL_CORE_FT
PROBLEM DIAGNOSES  Problem: ACL tear  Assessment and Plan: PROBLEM DIAGNOSES  Problem: ACL tear  Assessment and Plan: Pt scheduled for right knee arthroscopy, ACL reconstruction with bone tendon bone autigraft, and medial meniscus repair on 11/26/19.   labs pending,  Preop instructions provided to pt.   Famotidine and chlorhexidine scrubs provided to pt with instructions.

## 2019-11-22 NOTE — H&P PST ADULT - RS GEN PE MLT RESP DETAILS PC
respirations non-labored/no rales/no rhonchi/no wheezes/airway patent/clear to auscultation bilaterally

## 2019-11-22 NOTE — H&P PST ADULT - DENTITION
Diverticulitis    Some people get pouches along the wall of the colon as they get older. The pouches, called diverticuli, usually cause no symptoms. If the pouches become blocked, you can get an infection. This infection is called diverticulitis. It causes pain in your lower abdomen and fever. If not treated, it can become a serious condition, causing an abscess to form inside the pouch. The abscess may block the intestinal tract even or rupture, spreading infection throughout the abdomen.  When treatment is started early, oral antibiotics alone may be enough to cure diverticulitis. This method is tried first. But, if you don't improve or if your condition gets worse while using oral antibiotics, you may need to be admitted to the hospital for IV antibiotics. Severe cases may require surgery.  Home care  The following guidelines will help you care for yourself at home:    During the acute illness, rest and follow your healthcare provider's instructions about diet. Sometimes you will need to follow a clear liquid diet to rest your bowel. Once your symptoms are better, you may be told to follow a low-fiber diet for some time. Include foods like:    Flake cereal, mashed potatoes, pancakes, waffles, pasta, white bread, rice, applesauce, bananas, eggs, fish, poultry, tofu, and cooked soft vegetables    Take antibiotics exactly as instructed. Don't miss any doses or stop taking the medication, even if you feel better.    Monitor your temperature and tell your healthcare provider if you have rising temperatures.  Preventing future attacks  Once you have an episode of diverticulitis, you are at risk for having it again. After you have recovered from this episode, you may be able to lower your risk by eating a high-fiber diet (20 gm/day to 35 gm/day of fiber). This cleans out the colon pouches that already exist and may prevent new ones from forming. Foods high in fiber include fresh fruits and edible peelings, raw or  lightly cooked vegetables, whole grain cereals and breads, dried beans and peas, and bran.  Other steps that can help prevent future attacks include:    Take your medicines, such as antibiotics, as your healthcare provider says.    Drink 6 to 8 glasses of water every day, unless told otherwise.    Use a heating pad or hot water bottle to help abdominal cramping or pain.    Begin an exercise program. Ask your healthcare provider how to get started. You can benefit from simple activities such as walking or gardening.    Treat diarrhea with a bland diet. Start with liquids only; then slowly add fiber over time.    Watch for changes in your bowel movements (constipation to diarrhea). Avoid constipation by eating a high fiber diet and taking a stool softener if needed.    Get plenty of rest and sleep.  Follow-up care  Follow up with your healthcare provider as advised or sooner if you are not getting better in the next 2 days.  When to seek medical advice  Call your healthcare provider right away if any of these occur:    Fever of 100.4 F (38 C) or higher, or as directed by your healthcare provider    Repeated vomiting or swelling of the abdomen    Weakness, dizziness, light-headedness    Pain in your abdomen that gets worse, severe, or spreads to your back    Pain that moves to the right lower abdomen    Rectal bleeding (stools that are red, black or maroon color)    Unexpected vaginal bleeding  Date Last Reviewed: 9/1/2016 2000-2017 [employer]. [address] [place] [address]. All rights reserved. This information is not intended as a substitute for professional medical care. Always follow your healthcare professional's instructions.         denies loose teeth/normal

## 2019-11-22 NOTE — H&P PST ADULT - NSANTHOSAYNRD_GEN_A_CORE
No. AILYN screening performed.  STOP BANG Legend: 0-2 = LOW Risk; 3-4 = INTERMEDIATE Risk; 5-8 = HIGH Risk/never tested

## 2019-11-22 NOTE — H&P PST ADULT - NSICDXPASTMEDICALHX_GEN_ALL_CORE_FT
PAST MEDICAL HISTORY:  ACL tear right    Labral tear of shoulder right    Medial meniscus tear right

## 2019-12-06 PROBLEM — S83.519A SPRAIN OF ANTERIOR CRUCIATE LIGAMENT OF UNSPECIFIED KNEE, INITIAL ENCOUNTER: Chronic | Status: ACTIVE | Noted: 2019-11-22

## 2019-12-06 PROBLEM — S83.249A OTHER TEAR OF MEDIAL MENISCUS, CURRENT INJURY, UNSPECIFIED KNEE, INITIAL ENCOUNTER: Chronic | Status: ACTIVE | Noted: 2019-11-22

## 2019-12-12 ENCOUNTER — OUTPATIENT (OUTPATIENT)
Dept: OUTPATIENT SERVICES | Facility: HOSPITAL | Age: 19
LOS: 1 days | Discharge: HOME | End: 2019-12-12
Payer: MEDICAID

## 2019-12-12 ENCOUNTER — APPOINTMENT (OUTPATIENT)
Dept: ORTHOPEDIC SURGERY | Facility: AMBULATORY SURGERY CENTER | Age: 19
End: 2019-12-12

## 2019-12-12 VITALS
TEMPERATURE: 97 F | HEART RATE: 60 BPM | HEIGHT: 74 IN | WEIGHT: 192.02 LBS | SYSTOLIC BLOOD PRESSURE: 98 MMHG | RESPIRATION RATE: 16 BRPM | OXYGEN SATURATION: 98 % | DIASTOLIC BLOOD PRESSURE: 61 MMHG

## 2019-12-12 VITALS
TEMPERATURE: 98 F | RESPIRATION RATE: 14 BRPM | SYSTOLIC BLOOD PRESSURE: 105 MMHG | HEART RATE: 74 BPM | OXYGEN SATURATION: 99 % | DIASTOLIC BLOOD PRESSURE: 82 MMHG

## 2019-12-12 DIAGNOSIS — Z98.890 OTHER SPECIFIED POSTPROCEDURAL STATES: Chronic | ICD-10-CM

## 2019-12-12 DIAGNOSIS — S83.241A OTHER TEAR OF MEDIAL MENISCUS, CURRENT INJURY, RIGHT KNEE, INITIAL ENCOUNTER: ICD-10-CM

## 2019-12-12 LAB
HCT VFR BLD CALC: 42.8 % — SIGNIFICANT CHANGE UP (ref 39–50)
HGB BLD-MCNC: 14.6 G/DL — SIGNIFICANT CHANGE UP (ref 13–17)
MCHC RBC-ENTMCNC: 31.3 PG — SIGNIFICANT CHANGE UP (ref 27–34)
MCHC RBC-ENTMCNC: 34.1 % — SIGNIFICANT CHANGE UP (ref 32–36)
MCV RBC AUTO: 91.8 FL — SIGNIFICANT CHANGE UP (ref 80–100)
NRBC # FLD: 0 K/UL — SIGNIFICANT CHANGE UP (ref 0–0)
PLATELET # BLD AUTO: 216 K/UL — SIGNIFICANT CHANGE UP (ref 150–400)
PMV BLD: 9.5 FL — SIGNIFICANT CHANGE UP (ref 7–13)
RBC # BLD: 4.66 M/UL — SIGNIFICANT CHANGE UP (ref 4.2–5.8)
RBC # FLD: 12.5 % — SIGNIFICANT CHANGE UP (ref 10.3–14.5)
WBC # BLD: 8.41 K/UL — SIGNIFICANT CHANGE UP (ref 3.8–10.5)
WBC # FLD AUTO: 8.41 K/UL — SIGNIFICANT CHANGE UP (ref 3.8–10.5)

## 2019-12-12 PROCEDURE — 29888 ARTHRS AID ACL RPR/AGMNTJ: CPT | Mod: RT

## 2019-12-12 RX ORDER — ACETAMINOPHEN 500 MG
2 TABLET ORAL
Qty: 0 | Refills: 0 | DISCHARGE

## 2019-12-12 RX ORDER — FAMOTIDINE 10 MG/ML
0 INJECTION INTRAVENOUS
Qty: 0 | Refills: 0 | DISCHARGE

## 2019-12-12 RX ORDER — OXYCODONE HYDROCHLORIDE 5 MG/1
1 TABLET ORAL
Qty: 30 | Refills: 0
Start: 2019-12-12 | End: 2019-12-16

## 2019-12-12 RX ORDER — IBUPROFEN 200 MG
0 TABLET ORAL
Qty: 0 | Refills: 0 | DISCHARGE

## 2019-12-12 NOTE — ASU DISCHARGE PLAN (ADULT/PEDIATRIC) - CALL YOUR DOCTOR IF YOU HAVE ANY OF THE FOLLOWING:
See instruction sheet. Wound/Surgical Site with redness, or foul smelling discharge or pus/Numbness, tingling, color or temperature change to extremity/Nausea and vomiting that does not stop/Pain not relieved by Medications/Unable to urinate/Excessive diarrhea/Bleeding that does not stop/Fever greater than (need to indicate Fahrenheit or Celsius)/Increased irritability or sluggishness/Swelling that gets worse/See instruction sheet./Inability to tolerate liquids or foods

## 2019-12-12 NOTE — ASU DISCHARGE PLAN (ADULT/PEDIATRIC) - PAIN MANAGEMENT
Take over the counter pain medication/Prescriptions electronically submitted to pharmacy from [facility] Prescriptions electronically submitted to pharmacy from [facility]/Take pain medicine as needed/Take over the counter pain medication

## 2019-12-12 NOTE — ASU DISCHARGE PLAN (ADULT/PEDIATRIC) - CARE PROVIDER_API CALL
Pepe Preston)  Orthopaedic Sports Medicine; Orthopaedic Surgery  [address]  [place] [address]  Phone: [phone]  Fax: [phone]  Follow Up Time: 2 weeks

## 2019-12-19 NOTE — ASU DISCHARGE PLAN (ADULT/PEDIATRIC) - ASU DISCHARGE DATE/TIME
Hide Additional Size Dimension?: No Path Notes (To The Dermatopathologist): . Anesthesia Volume In Cc: 0.5 Notification Instructions: Patient will be notified of biopsy results. However, patient instructed to call the office if not contacted within 2 weeks. Accession #: pc Biopsy Method: Dermablade Post-Care Instructions: I reviewed with the patient in detail post-care instructions. Patient is to keep the biopsy site dry overnight, and then apply bacitracin twice daily until healed. Patient may apply hydrogen peroxide soaks to remove any crusting. Electrodesiccation Text: The wound bed was treated with electrodesiccation after the biopsy was performed. Electrodesiccation And Curettage Text: The wound bed was treated with electrodesiccation and curettage after the biopsy was performed. Size Of Lesion In Cm: 0 Dressing: bandage Silver Nitrate Text: The wound bed was treated with silver nitrate after the biopsy was performed. Hemostasis: Aluminum Chloride Wound Care: Vaseline Biopsy Type: H and E Was A Bandage Applied: Yes Depth Of Biopsy: dermis Billing Type: Third-Party Bill Curettage Text: The wound bed was treated with curettage after the biopsy was performed. Cryotherapy Text: The wound bed was treated with cryotherapy after the biopsy was performed. Type Of Destruction Used: Curettage Consent: Written consent was obtained and risks were reviewed including but not limited to scarring, infection, bleeding, scabbing, incomplete removal, nerve damage and allergy to anesthesia. Anesthesia Type: 1% lidocaine with epinephrine Detail Level: Detailed 12-Dec-2019

## 2019-12-20 ENCOUNTER — APPOINTMENT (OUTPATIENT)
Dept: ORTHOPEDIC SURGERY | Facility: CLINIC | Age: 19
End: 2019-12-20
Payer: MEDICAID

## 2019-12-20 PROCEDURE — 99024 POSTOP FOLLOW-UP VISIT: CPT

## 2020-01-19 ENCOUNTER — TRANSCRIPTION ENCOUNTER (OUTPATIENT)
Age: 20
End: 2020-01-19

## 2020-01-21 ENCOUNTER — APPOINTMENT (OUTPATIENT)
Dept: ORTHOPEDIC SURGERY | Facility: CLINIC | Age: 20
End: 2020-01-21
Payer: MEDICAID

## 2020-01-21 PROCEDURE — 99024 POSTOP FOLLOW-UP VISIT: CPT

## 2020-01-21 NOTE — HISTORY OF PRESENT ILLNESS
[Healed] : healed [Neuro Intact] : an unremarkable neurological exam [Vascular Intact] : ~T peripheral vascular exam normal [Doing Well] : is doing well [Adequate Pain Control] : has adequate pain control [Erythema] : not erythematous [Discharge] : absent of discharge [Dehiscence] : not dehisced [de-identified] : Pt presents 5.5 weeks post right knee arthroscopicassisted anterior cruciate ligament reconstruction using bone-patellar  tendon-bone autograft 12/12/19 [de-identified] : Right knee: Healed surgical scar. Trace swelling right knee joint. Disuse atrophy right thigh quadriceps. Able to both on the right lower extremity straight leg raise. 0-120° right knee flexion. Stable endpoint with anterior drawer. No calf tenderness. [de-identified] : He is doing well. He is participating in PT 2x per week. He discontinued his Elaine brace one week ago. He has difficulty climbing steps.  [de-identified] : Continue supervised physical therapy 2 days per week. Reviewed postop exercise program. Can progress to  treadmill and walking and [employer] - short steps. Followup in 6 weeks

## 2020-02-27 ENCOUNTER — APPOINTMENT (OUTPATIENT)
Dept: ORTHOPEDIC SURGERY | Facility: CLINIC | Age: 20
End: 2020-02-27

## 2020-06-29 NOTE — H&P PST ADULT - PAIN SCALE PREFERRED, PROFILE
[Awake] : awake [Acute Distress] : no acute distress [Mass] : no breast mass [Alert] : alert [Axillary LAD] : no axillary lymphadenopathy [Nipple Discharge] : no nipple discharge [Soft] : soft [Tender] : non tender [Oriented x3] : oriented to person, place, and time [No Bleeding] : there was no active vaginal bleeding [Uterine Adnexae] : were not tender and not enlarged [Normal] : uterus numerical 0-10

## 2020-07-30 ENCOUNTER — APPOINTMENT (OUTPATIENT)
Dept: ORTHOPEDIC SURGERY | Facility: CLINIC | Age: 20
End: 2020-07-30
Payer: MEDICAID

## 2020-07-30 VITALS — TEMPERATURE: 98 F

## 2020-07-30 DIAGNOSIS — Z98.890 OTHER SPECIFIED POSTPROCEDURAL STATES: ICD-10-CM

## 2020-07-30 DIAGNOSIS — M62.81 MUSCLE WEAKNESS (GENERALIZED): ICD-10-CM

## 2020-07-30 PROCEDURE — 99213 OFFICE O/P EST LOW 20 MIN: CPT

## 2020-10-29 NOTE — ASU PREOP CHECKLIST - NS PREOP CHK CHLOROHEX WASH
Attempted to contact patient again in regards to referral to home health and equipment. There was no answer again. The voice mail continues to be full so I am unable to leave a message.      at home:

## 2022-03-21 NOTE — H&P PST ADULT - NSICDXPASTSURGICALHX_GEN_ALL_CORE_FT
Procedure Performed: SULMA PAST SURGICAL HISTORY:  H/O arthroscopy of shoulder right in 2018

## 2023-09-01 NOTE — H&P PST ADULT - NSALCOHOLAMT_GEN_A_CORE_SD
1-2 drinks Adbry Counseling: I discussed with the patient the risks of tralokinumab including but not limited to eye infection and irritation, cold sores, injection site reactions, worsening of asthma, allergic reactions and increased risk of parasitic infection.  Live vaccines should be avoided while taking tralokinumab. The patient understands that monitoring is required and they must alert us or the primary physician if symptoms of infection or other concerning signs are noted.
